# Patient Record
Sex: FEMALE
[De-identification: names, ages, dates, MRNs, and addresses within clinical notes are randomized per-mention and may not be internally consistent; named-entity substitution may affect disease eponyms.]

---

## 2020-10-29 PROBLEM — Z00.00 ENCOUNTER FOR PREVENTIVE HEALTH EXAMINATION: Status: ACTIVE | Noted: 2020-10-29

## 2020-11-05 ENCOUNTER — APPOINTMENT (OUTPATIENT)
Dept: ORTHOPEDIC SURGERY | Facility: CLINIC | Age: 55
End: 2020-11-05
Payer: COMMERCIAL

## 2020-11-05 VITALS
DIASTOLIC BLOOD PRESSURE: 82 MMHG | HEIGHT: 63 IN | WEIGHT: 136 LBS | HEART RATE: 76 BPM | SYSTOLIC BLOOD PRESSURE: 151 MMHG | BODY MASS INDEX: 24.1 KG/M2

## 2020-11-05 DIAGNOSIS — Z86.79 PERSONAL HISTORY OF OTHER DISEASES OF THE CIRCULATORY SYSTEM: ICD-10-CM

## 2020-11-05 DIAGNOSIS — M79.641 PAIN IN RIGHT HAND: ICD-10-CM

## 2020-11-05 DIAGNOSIS — Z72.89 OTHER PROBLEMS RELATED TO LIFESTYLE: ICD-10-CM

## 2020-11-05 PROCEDURE — 99072 ADDL SUPL MATRL&STAF TM PHE: CPT

## 2020-11-05 PROCEDURE — 73130 X-RAY EXAM OF HAND: CPT | Mod: RT

## 2020-11-05 PROCEDURE — 99203 OFFICE O/P NEW LOW 30 MIN: CPT

## 2020-11-05 RX ORDER — MELOXICAM 15 MG/1
15 TABLET ORAL DAILY
Qty: 30 | Refills: 1 | Status: ACTIVE | COMMUNITY
Start: 2020-11-05 | End: 1900-01-01

## 2020-11-05 RX ORDER — ESCITALOPRAM OXALATE 10 MG/1
10 TABLET, FILM COATED ORAL
Refills: 0 | Status: ACTIVE | COMMUNITY

## 2020-11-05 RX ORDER — AMLODIPINE BESYLATE AND BENAZEPRIL HYDROCHLORIDE 10; 20 MG/1; MG/1
10-20 CAPSULE ORAL
Refills: 0 | Status: ACTIVE | COMMUNITY

## 2021-08-20 ENCOUNTER — EMERGENCY (EMERGENCY)
Facility: HOSPITAL | Age: 56
LOS: 1 days | Discharge: HOME | End: 2021-08-20
Attending: EMERGENCY MEDICINE | Admitting: EMERGENCY MEDICINE
Payer: COMMERCIAL

## 2021-08-20 VITALS
HEART RATE: 54 BPM | TEMPERATURE: 98 F | SYSTOLIC BLOOD PRESSURE: 134 MMHG | DIASTOLIC BLOOD PRESSURE: 77 MMHG | RESPIRATION RATE: 16 BRPM | OXYGEN SATURATION: 98 %

## 2021-08-20 VITALS
DIASTOLIC BLOOD PRESSURE: 98 MMHG | TEMPERATURE: 99 F | RESPIRATION RATE: 18 BRPM | HEART RATE: 70 BPM | SYSTOLIC BLOOD PRESSURE: 163 MMHG | WEIGHT: 139.99 LBS | OXYGEN SATURATION: 98 %

## 2021-08-20 DIAGNOSIS — Z98.890 OTHER SPECIFIED POSTPROCEDURAL STATES: Chronic | ICD-10-CM

## 2021-08-20 DIAGNOSIS — S92.902A UNSPECIFIED FRACTURE OF LEFT FOOT, INITIAL ENCOUNTER FOR CLOSED FRACTURE: ICD-10-CM

## 2021-08-20 PROCEDURE — 73630 X-RAY EXAM OF FOOT: CPT | Mod: 26,LT

## 2021-08-20 PROCEDURE — 76376 3D RENDER W/INTRP POSTPROCES: CPT

## 2021-08-20 PROCEDURE — 73610 X-RAY EXAM OF ANKLE: CPT | Mod: 26,LT

## 2021-08-20 PROCEDURE — 73610 X-RAY EXAM OF ANKLE: CPT

## 2021-08-20 PROCEDURE — 90471 IMMUNIZATION ADMIN: CPT

## 2021-08-20 PROCEDURE — 72125 CT NECK SPINE W/O DYE: CPT | Mod: MA

## 2021-08-20 PROCEDURE — 70450 CT HEAD/BRAIN W/O DYE: CPT | Mod: MA

## 2021-08-20 PROCEDURE — 99284 EMERGENCY DEPT VISIT MOD MDM: CPT | Mod: 25

## 2021-08-20 PROCEDURE — 71101 X-RAY EXAM UNILAT RIBS/CHEST: CPT | Mod: 26

## 2021-08-20 PROCEDURE — G1004: CPT

## 2021-08-20 PROCEDURE — 73700 CT LOWER EXTREMITY W/O DYE: CPT | Mod: MG

## 2021-08-20 PROCEDURE — 70450 CT HEAD/BRAIN W/O DYE: CPT | Mod: 26,MA

## 2021-08-20 PROCEDURE — 90715 TDAP VACCINE 7 YRS/> IM: CPT

## 2021-08-20 PROCEDURE — 72125 CT NECK SPINE W/O DYE: CPT | Mod: 26,MA

## 2021-08-20 PROCEDURE — 73630 X-RAY EXAM OF FOOT: CPT

## 2021-08-20 PROCEDURE — 73700 CT LOWER EXTREMITY W/O DYE: CPT | Mod: 26,LT,MG

## 2021-08-20 PROCEDURE — 71101 X-RAY EXAM UNILAT RIBS/CHEST: CPT

## 2021-08-20 PROCEDURE — 76376 3D RENDER W/INTRP POSTPROCES: CPT | Mod: 26

## 2021-08-20 PROCEDURE — 99285 EMERGENCY DEPT VISIT HI MDM: CPT

## 2021-08-20 PROCEDURE — 99283 EMERGENCY DEPT VISIT LOW MDM: CPT

## 2021-08-20 RX ORDER — TETANUS TOXOID, REDUCED DIPHTHERIA TOXOID AND ACELLULAR PERTUSSIS VACCINE, ADSORBED 5; 2.5; 8; 8; 2.5 [IU]/.5ML; [IU]/.5ML; UG/.5ML; UG/.5ML; UG/.5ML
0.5 SUSPENSION INTRAMUSCULAR ONCE
Refills: 0 | Status: COMPLETED | OUTPATIENT
Start: 2021-08-20 | End: 2021-08-20

## 2021-08-20 RX ORDER — IBUPROFEN 200 MG
600 TABLET ORAL ONCE
Refills: 0 | Status: DISCONTINUED | OUTPATIENT
Start: 2021-08-20 | End: 2021-08-20

## 2021-08-20 RX ORDER — ACETAMINOPHEN 500 MG
650 TABLET ORAL ONCE
Refills: 0 | Status: COMPLETED | OUTPATIENT
Start: 2021-08-20 | End: 2021-08-20

## 2021-08-20 RX ORDER — OXYCODONE AND ACETAMINOPHEN 5; 325 MG/1; MG/1
1 TABLET ORAL
Qty: 16 | Refills: 0
Start: 2021-08-20 | End: 2021-08-23

## 2021-08-20 RX ADMIN — TETANUS TOXOID, REDUCED DIPHTHERIA TOXOID AND ACELLULAR PERTUSSIS VACCINE, ADSORBED 0.5 MILLILITER(S): 5; 2.5; 8; 8; 2.5 SUSPENSION INTRAMUSCULAR at 10:13

## 2021-08-20 RX ADMIN — Medication 650 MILLIGRAM(S): at 10:13

## 2021-08-20 NOTE — ED PROVIDER NOTE - CARE PLAN
Principal Discharge DX:	Foot, fracture, navicular  Secondary Diagnosis:	Fracture, cuboid  Secondary Diagnosis:	Scalp hematoma   1
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
## 2021-08-20 NOTE — CONSULT NOTE ADULT - PROBLEM SELECTOR RECOMMENDATION 9
- Patient seen and evaluated with Dr. Myles at bedside  - Ruled out any signs of compartment syndrome, no signs   - Xrays and CT reviewed and findings discussed with the patient- non displaced navicular fracture of the left foot  - Discussed with the patient the normal healing times of bone 6-8 weeks  - Discussed with the patient the importance of compliance and the risk of delayed bone healing if weight bearing  - patient immobilized in hogan compression with posterior splint secured with ACE bandage  - Abrasions dressed with adaptic and DSD  - Discussed with patient RICE therapy- Rest, Ice, Compression and Elevation as much as possible  - patient to take OTC NSAIDS for pain management  - patient to follow up with Dr. Myles in podiatry clinic or wound care clinic within 1 week of being discharged   - Patient to get shower cast protector to keep the area clean dry and intact  - Podiatry stable  - Instructions below   WOUND CARE INSTRUCTIONS   1. Keep Dressing Clean, dry and intact at all times until first follow up appointment with Dr. Myles   2. Monitor for any signs of infection.  3. Patient is to follow up [facility] with Dr. Rosales or Dr. Myles within 1 week upon discharge.

## 2021-08-20 NOTE — ED PROVIDER NOTE - PROGRESS NOTE DETAILS
pt seen and splinted by podiatry. Stable for discharge home with outpatient follow up . Copy of results and disc provided, all questions answered.

## 2021-08-20 NOTE — ED PROVIDER NOTE - PATIENT PORTAL LINK FT
You can access the FollowMyHealth Patient Portal offered by [facility] by registering at the following website: [email or web address]. By joining [facility]’s FollowMyHealth portal, you will also be able to view your health information using other applications (apps) compatible with our system.

## 2021-08-20 NOTE — CONSULT NOTE ADULT - SUBJECTIVE AND OBJECTIVE BOX
HPI:      55y year old Female seen at [facility] ED for ----------.  Denies any fever, chills, nausea, vomiting, chest pain, shortness of breath, or calf pain at this time.    REVIEW OF SYSTEMS    PAST MEDICAL & SURGICAL HISTORY:  Benign essential HTN    Depression    S/P skin cancer resection        Allergies    penicillin (Unknown)    Intolerances        MEDICATIONS  (STANDING):    MEDICATIONS  (PRN):      Social History:      FAMILY HISTORY:      Vital Signs Last 24 Hrs  T(C): 37.4 (20 Aug 2021 08:45), Max: 37.4 (20 Aug 2021 08:45)  T(F): 99.4 (20 Aug 2021 08:45), Max: 99.4 (20 Aug 2021 08:45)  HR: 70 (20 Aug 2021 08:45) (70 - 70)  BP: 163/98 (20 Aug 2021 08:45) (163/98 - 163/98)  BP(mean): --  RR: 18 (20 Aug 2021 08:45) (18 - 18)  SpO2: 98% (20 Aug 2021 08:45) (98% - 98%)    PHYSICAL EXAM:  Vascular: DP & PT palpable bilaterally, Capillary refill 3 seconds, Digital hair present bilaterally  Neurological: Light touch sensation intact bilaterally  Musculoskeletal: 5/5 strength in all quadrants bilaterally, AJ & STJ ROM intact  Dermatological: ---------- cm ulceration noted to the ----------, granular wound bed, no probe to bone, no periwound erythema, no fluctuance, no malodor, no proximal streaking at this time        ----------CHEM PANEL----------            Imaging: ----------

## 2021-08-20 NOTE — ED PROVIDER NOTE - NSFOLLOWUPINSTRUCTIONS_ED_ALL_ED_FT
Return to the ED for any new or worsening symptoms  Take your medication as prescribed  Percocet per label instructions as needed for pain do not drive when taking. For mild to moderate pain you can take Motrin or Tylenol   Keep splint in place until cleared by podiatry  Non weight bearing until cleared by podiatry  Elevate leg when seated  Follow up with podiatry call tomorrow to schedule follow up  Advance activity as tolerated  Bacitracin to affected abrasion 2 times a day   Foot Fracture in Adults    WHAT YOU NEED TO KNOW:    A foot fracture is a break in a bone in your foot.    Foot Anatomy         DISCHARGE INSTRUCTIONS:    Call your local emergency number (911 in the [place]) if:   •You suddenly feel lightheaded and short of breath.      •You have chest pain when you take a deep breath or cough.      •You cough up blood.      Return to the emergency department if:   •The pain in your injured foot gets worse even after you rest and take pain medicine.      •The skin or toes of your foot become numb, swollen, cold, white, or blue.      •You have more pain or swelling than you did before a cast was put on.      •Your leg feels warm, tender, and painful. It may look swollen and red.      Call your doctor if:   •You have a fever.      •You have new sores around your boot, cast, or splint.      •You have new or worsening trouble moving your foot.      •You notice a foul smell coming from under your cast.      •Your boot, cast, or splint gets damaged.      •You have questions or concerns about your condition or care.      Medicines: You may need any of the following:   •Antibiotics: This medicine is given to help treat or prevent an infection caused by bacteria.       •NSAIDs, such as ibuprofen, help decrease swelling, pain, and fever. NSAIDs can cause stomach bleeding or kidney problems in certain people. If you take blood thinner medicine, always ask your healthcare provider if NSAIDs are safe for you. Always read the medicine label and follow directions.      •Prescription pain medicine may be given. Ask your healthcare provider how to take this medicine safely. Some prescription pain medicines contain acetaminophen. Do not take other medicines that contain acetaminophen without talking to your healthcare provider. Too much acetaminophen may cause liver damage. Prescription pain medicine may cause constipation. Ask your healthcare provider how to prevent or treat constipation.       •Take your medicine as directed. Contact your healthcare provider if you think your medicine is not helping or if you have side effects. Tell him or her if you are allergic to any medicine. Keep a list of the medicines, vitamins, and herbs you take. Include the amounts, and when and why you take them. Bring the list or the pill bottles to follow-up visits. Carry your medicine list with you in case of an emergency.      Self-care:   •Rest your foot and avoid activities that cause pain.      •Apply ice to decrease swelling and pain, and to prevent tissue damage. Use an ice pack, or put crushed ice in a plastic bag. Cover it with a towel before you apply it. Use ice for 15 to 20 minutes every hour or as directed.      •Elevate your foot above the level of your heart as often as you can. This will help decrease swelling and pain. Prop your foot on pillows or blankets to keep it elevated comfortably.  Elevate Leg           •Physical therapy may be needed when your foot has healed. A physical therapist can teach you exercises to help improve movement and strength, and to decrease pain.      Cast or splint care:   •Ask when it is okay to take a bath or shower. Do not let your cast or splint get wet. Before you bathe, cover the cast or splint with a plastic bag. Tape the bag to your skin above the splint to seal out water. Keep your foot out of the water in case the bag leaks.      •Check the skin around your splint daily for any redness or open areas.      •Do not use a sharp or pointed object to scratch your skin under the splint.      •Do not remove your splint unless your healthcare provider or orthopedic surgeon says it is okay.      Assistive devices: You may be given a hard-soled shoe to wear while your foot is healing. You also may need to use crutches to help you walk while your foot heals. It is important to use your crutches correctly. Ask for more information about how to use crutches.    Follow up with your doctor or bone specialist as directed: You may need to return to have your splint or stitches removed. You may also need to return for tests to make sure your foot is healing. Write down your questions so you remember to ask them during your visits.
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
## 2021-08-20 NOTE — ED PROVIDER NOTE - CARDIAC, MLM
Normal rate, regular rhythm.  Heart sounds S1, S2.  No murmurs, rubs or gallops. TTP to left upper lateral chest wall

## 2021-08-20 NOTE — ED ADULT NURSE NOTE - NSIMPLEMENTINTERV_GEN_ALL_ED
Implemented All Fall Risk Interventions:  [place] to call system. Call bell, personal items and telephone within reach. Instruct patient to call for assistance. Room bathroom lighting operational. Non-slip footwear when patient is off stretcher. Physically safe environment: no spills, clutter or unnecessary equipment. Stretcher in lowest position, wheels locked, appropriate side rails in place. Provide visual cue, wrist band, yellow gown, etc. Monitor gait and stability. Monitor for mental status changes and reorient to person, place, and time. Review medications for side effects contributing to fall risk. Reinforce activity limits and safety measures with patient and family.

## 2022-12-13 NOTE — ED PROVIDER NOTE - CARE PROVIDER_API CALL
L Inj/Asp: L greater trochanteric bursa on 3/27/2019 1:38 PM  Indications: pain  Details: 22 G needle, lateral approach  Medications: 1 mL lidocaine 1 %; 40 mg methylPREDNISolone DEPOT 40 MG/ML    The patient was explained the risks and benefits of an injection.  There is risk of injuring the nerve for artery.  There is also risk of infection or potentially a tendon rupture.  There is no guarantee that the injection will help.  The patient may need more than one injection.  The patient seems to understand and has decided for the injection.    The left lateral hip was sterilely prepped with Betadine swab.  This was directly over the left trochanteric bursa region.  I sterilely injected into the bursa 40 mg of Depo-Medrol and 1 cc of 1% Lidocaine.  The area was cleansed and a Band-Aid was applied.  The patient tolerated the procedure well.  They were instructed to ice the hip down tonight    Consent was given by the patient.          [address] ([email or web address])  [place] [address]  Phone: [phone]  Fax: [phone]  Follow Up Time: